# Patient Record
Sex: MALE | Employment: FULL TIME | URBAN - METROPOLITAN AREA
[De-identification: names, ages, dates, MRNs, and addresses within clinical notes are randomized per-mention and may not be internally consistent; named-entity substitution may affect disease eponyms.]

---

## 2020-11-05 ENCOUNTER — NURSE TRIAGE (OUTPATIENT)
Dept: OTHER | Facility: OTHER | Age: 30
End: 2020-11-05

## 2020-11-05 DIAGNOSIS — Z11.59 SCREENING FOR VIRAL DISEASE: Primary | ICD-10-CM

## 2023-05-23 ENCOUNTER — OFFICE VISIT (OUTPATIENT)
Age: 33
End: 2023-05-23

## 2023-05-23 VITALS
OXYGEN SATURATION: 98 % | HEIGHT: 71 IN | DIASTOLIC BLOOD PRESSURE: 78 MMHG | BODY MASS INDEX: 33.04 KG/M2 | SYSTOLIC BLOOD PRESSURE: 126 MMHG | HEART RATE: 84 BPM | RESPIRATION RATE: 16 BRPM | TEMPERATURE: 97.2 F | WEIGHT: 236 LBS

## 2023-05-23 DIAGNOSIS — L23.7 POISON IVY DERMATITIS: Primary | ICD-10-CM

## 2023-05-23 RX ORDER — TRIAMCINOLONE ACETONIDE 40 MG/ML
40 INJECTION, SUSPENSION INTRA-ARTICULAR; INTRAMUSCULAR ONCE
Status: COMPLETED | OUTPATIENT
Start: 2023-05-23 | End: 2023-05-23

## 2023-05-23 RX ADMIN — TRIAMCINOLONE ACETONIDE 40 MG: 40 INJECTION, SUSPENSION INTRA-ARTICULAR; INTRAMUSCULAR at 19:25

## 2023-05-23 NOTE — PROGRESS NOTES
[address] [facility]        NAME: Ki Adkins is a 28 y o  male  : 1990    MRN: [id number]  DATE: May 23, 2023  TIME: 7:29 PM    Assessment and Plan   Poison ivy dermatitis [L23 7]  1  Poison ivy dermatitis  triamcinolone acetonide (KENALOG-40) 40 mg/mL injection 40 mg            Patient Instructions     Patient has poison ivy dermatitis  He has had success with Kenalog 40 mg IM injection in the past so he will be given this today  Discussed over-the-counter topical options to treat as well  Follow up with PCP in 3-5 days  Proceed to  ER if symptoms worsen  Chief Complaint     Chief Complaint   Patient presents with   • Rash     Possible poison ivy  Both forearms and forehead  x3days         History of Present Illness       Presents with 3-day history of rash that he believes is caused by poison ivy  It is on his upper extremities as well as the right side of his forehead  He reports itching but no areas of weeping or discharge  Has been given Kenalog IM injection in the past with success  Rash        Review of Systems   Review of Systems   Constitutional: Negative  HENT: Negative  Respiratory: Negative  Cardiovascular: Negative  Gastrointestinal: Negative  Genitourinary: Negative  Skin: Positive for rash  Current Medications     No current outpatient medications on file  No current facility-administered medications for this visit  Current Allergies     Allergies as of 2023   • (No Known Allergies)            The following portions of the patient's history were reviewed and updated as appropriate: allergies, current medications, past family history, past medical history, past social history, past surgical history and problem list      History reviewed  No pertinent past medical history      Past Surgical History:   Procedure Laterality Date   • TONSILLECTOMY         Family History   Problem Relation Age of Onset   • Hypertension Father          Medications "have been verified  Objective   /78 (BP Location: Right arm, Patient Position: Sitting, Cuff Size: Large)   Pulse 84   Temp (!) 97 2 °F (36 2 °C) (Tympanic)   Resp 16   Ht 5' 11\" (1 803 m)   Wt 107 kg (236 lb)   SpO2 98%   BMI 32 92 kg/m²   No LMP for male patient  Physical Exam     Physical Exam  Vitals reviewed  Constitutional:       General: He is not in acute distress  Appearance: He is well-developed  Skin:     Findings: Rash ([place] erythematous maculopapular rash on bilateral upper extremities as well as right side of forehead in linear distribution resembling poison ivy dermatitis  No areas of blistering or weeping) present  Neurological:      Mental Status: He is alert and oriented to person, place, and time                     "

## 2023-05-23 NOTE — PATIENT INSTRUCTIONS
Poison Ivy   WHAT YOU NEED TO KNOW:   Poison ivy is a plant that can cause an itchy, uncomfortable rash on your skin  Poison ivy grows as a shrub or vine in woods, fields, and areas of thick [place]  It has 3 bright green leaves on each stem that turn red in bib[place]  DISCHARGE INSTRUCTIONS:   Medicines:   Antiseptic or drying creams or ointments: These medicines may be used to dry out the rash and decrease the itching  These products may be available without a doctor's order  Steroids: This medicine helps decrease itching and inflammation  It can be given as a cream to apply to your skin or as a pill  Antihistamines: This medicine may help decrease itching and help you sleep  It is available without a doctor's order  Take your medicine as directed  Contact your healthcare provider if you think your medicine is not helping or if you have side effects  Tell your provider if you are allergic to any medicine  Keep a list of the medicines, vitamins, and herbs you take  Include the amounts, and when and why you take them  Bring the list or the pill bottles to follow-up visits  Carry your medicine list with you in case of an emergency  Follow up with your doctor as directed:  Write down your questions so you remember to ask them during your visits  How your poison ivy rash spreads: You cannot spread poison ivy by touching your rash or the liquid from your blisters  Poison ivy is spread only if you scratch your skin while it still has oil on it  You may think your rash is spreading because new rashes appear over a number of days  This happens because areas covered by thin skin break out in a rash first  Your face or forearms may develop a rash before thicker areas, such as the palms of your hands  Self-care:   Keep your rash clean and dry:  Wash it with soap and water  Gently pat it dry with a clean towel  Try not to scratch or rub your rash: This can cause your skin to become infected      Use a compress on your rash:  Dip a clean washcloth in cool water  Wring it out and place it on your rash  Leave the washcloth on your skin for 15 minutes  Do this at least 3 times per day  Take a cornstarch or oatmeal bath: If your rash is too large to cover with wet washcloths, take 3 or 4 cornstarch baths daily  Mix 1 pound of cornstarch with a little water to make a paste  Add the paste to a tub full of water and mix well  You may also use colloidal oatmeal in the bath water  Use lukewarm water  Avoid hot water because it may cause your itching to increase  Prevent a poison ivy rash in the future:   Wear skin protection:  Wear long pants, a long-sleeved shirt, and gloves  Use a skin block lotion to protect your skin from poison ivy oil  You can find this at a drugstore without a prescription  Wash clothing after possible exposure: If you think you have been near a poison ivy plant, wash the clothes you were wearing separately from other clothes  Rinse the washing machine well after you take the clothes out  Scrub boots and shoes with warm, soapy water  Dry clean items and clothing that you cannot wash in water  Poison ivy oil is sticky and can stay on surfaces for a long time  It can cause a new rash even years later  Bathe your pet:  Use warm water and shampoo on your pet's fur  This will prevent the spread of oil to your skin, car, and home  Wear long sleeves, long pants, and gloves while washing pets or any items that may have oil on them  Reduce exposure to poison ivy:  Do not touch plants that look like poison ivy  Keep your yard free of poison ivy  While protecting your skin, remove the plant and the roots  Place them in a plastic bag and seal the bag tightly  Do not burn poison ivy plants: This can spread the oil through the air  If you breathe the oil into your lungs, you could have swelling and serious breathing problems   Oil that clings to the fire clara can land on your skin and cause a rash  Contact your healthcare provider if:   You have pus, soft yellow scabs, or tenderness on the rash  The itching gets worse or keeps you awake at night  The rash covers more than 1/4 of your skin or spreads to your eyes, mouth, or genital area  The rash is not better after 2 to 3 weeks  You have tender, swollen glands on the sides of your neck  You have swelling in your arms and legs  You have questions or concerns about your condition or care  Return to the emergency department if:   You have a fever  You have redness, swelling, and tenderness around the rash  You have trouble breathing  © Copyright [employer] 2022 Information is for End User's use only and may not be sold, redistributed or otherwise used for commercial purposes  The above information is an  only  It is not intended as medical advice for individual conditions or treatments  Talk to your doctor, nurse or pharmacist before following any medical regimen to see if it is safe and effective for you

## 2024-07-29 ENCOUNTER — APPOINTMENT (OUTPATIENT)
Age: 34
End: 2024-07-29

## 2025-01-29 ENCOUNTER — APPOINTMENT (OUTPATIENT)
Age: 35
End: 2025-01-29